# Patient Record
Sex: MALE | Race: BLACK OR AFRICAN AMERICAN | Employment: UNEMPLOYED | URBAN - METROPOLITAN AREA
[De-identification: names, ages, dates, MRNs, and addresses within clinical notes are randomized per-mention and may not be internally consistent; named-entity substitution may affect disease eponyms.]

---

## 2017-08-11 ENCOUNTER — HOSPITAL ENCOUNTER (OUTPATIENT)
Age: 57
Discharge: HOME | End: 2017-08-11
Attending: EMERGENCY MEDICINE
Payer: COMMERCIAL

## 2017-08-11 VITALS
WEIGHT: 200 LBS | HEART RATE: 73 BPM | DIASTOLIC BLOOD PRESSURE: 85 MMHG | OXYGEN SATURATION: 100 % | TEMPERATURE: 98 F | SYSTOLIC BLOOD PRESSURE: 127 MMHG | RESPIRATION RATE: 20 BRPM

## 2017-08-11 DIAGNOSIS — M10.9 ACUTE GOUT OF LEFT FOOT, UNSPECIFIED CAUSE: Primary | ICD-10-CM

## 2017-08-11 PROCEDURE — 99204 OFFICE O/P NEW MOD 45 MIN: CPT

## 2017-08-11 PROCEDURE — 99203 OFFICE O/P NEW LOW 30 MIN: CPT

## 2017-08-11 RX ORDER — COLCHICINE 0.6 MG/1
0.6 CAPSULE ORAL SEE ADMIN INSTRUCTIONS
Qty: 10 CAPSULE | Refills: 0 | Status: SHIPPED | OUTPATIENT
Start: 2017-08-11 | End: 2017-09-10

## 2017-08-11 RX ORDER — COLCHICINE 0.6 MG/1
0.6 TABLET ORAL AS NEEDED
COMMUNITY

## 2017-08-11 NOTE — ED PROVIDER NOTES
Patient Seen in: [address]    History   Patient presents with:  Swelling Edema (cardiovascular, metabolic): left foot    Stated Complaint: lt foot pain    HPI    50-year-old male with history of gout here with complaint All other systems reviewed and are negative. Positive for stated complaint: lt foot pain  Other systems are as noted in HPI. Constitutional and vital signs reviewed. All other systems reviewed and negative except as noted above.     Narcisa Mascorro hour(s)). Imaging:          EMERGENCY DEPARTMENT COURSE AND TREATMENT:  Patient's condition was stable during Emergency Department evaluation.      62yoM with gout flair  - afebrile, hemodynamically stable  - pt requesting refill of his medication - does

## 2017-08-11 NOTE — ED INITIAL ASSESSMENT (HPI)
Pt has had gout flair up in left foot since last night. Pt here from [place], needs refill of med. Pain in left foot.  Increased pain with ambulation

## 2017-08-11 NOTE — ED NOTES
Discussed DC papers and Rx with pt. Called in Rx for pt.  Pt verbalized understanding of all DC instructions

## (undated) NOTE — LETTER
8/24/2017  Cherelleskyler Nishantcecilio  [place] [id number]     According to our records, it is indicated that you visited one of our [address] [place] [address] clinics; please follow up with your primary care physician.  If you are in need of a primary care do